# Patient Record
Sex: FEMALE | Race: WHITE
[De-identification: names, ages, dates, MRNs, and addresses within clinical notes are randomized per-mention and may not be internally consistent; named-entity substitution may affect disease eponyms.]

---

## 2020-10-19 ENCOUNTER — HOSPITAL ENCOUNTER (OUTPATIENT)
Dept: HOSPITAL 63 - ER | Age: 70
Setting detail: OBSERVATION
LOS: 1 days | Discharge: LEFT BEFORE DISCHARGE | End: 2020-10-20
Attending: HOSPITALIST | Admitting: HOSPITALIST
Payer: COMMERCIAL

## 2020-10-19 VITALS — BODY MASS INDEX: 44.41 KG/M2 | HEIGHT: 68 IN | WEIGHT: 293 LBS

## 2020-10-19 VITALS — SYSTOLIC BLOOD PRESSURE: 140 MMHG | DIASTOLIC BLOOD PRESSURE: 77 MMHG

## 2020-10-19 VITALS — DIASTOLIC BLOOD PRESSURE: 83 MMHG | SYSTOLIC BLOOD PRESSURE: 141 MMHG

## 2020-10-19 DIAGNOSIS — I50.9: ICD-10-CM

## 2020-10-19 DIAGNOSIS — I11.0: Primary | ICD-10-CM

## 2020-10-19 DIAGNOSIS — K21.9: ICD-10-CM

## 2020-10-19 DIAGNOSIS — E11.9: ICD-10-CM

## 2020-10-19 DIAGNOSIS — Z79.899: ICD-10-CM

## 2020-10-19 DIAGNOSIS — E66.01: ICD-10-CM

## 2020-10-19 LAB
ALBUMIN SERPL-MCNC: 3.3 G/DL (ref 3.4–5)
ALBUMIN/GLOB SERPL: 1 {RATIO} (ref 1–1.7)
ALP SERPL-CCNC: 93 U/L (ref 46–116)
ALT SERPL-CCNC: 25 U/L (ref 14–59)
ANION GAP SERPL CALC-SCNC: 8 MMOL/L (ref 6–14)
AST SERPL-CCNC: 16 U/L (ref 15–37)
BASOPHILS # BLD AUTO: 0.1 X10^3/UL (ref 0–0.2)
BASOPHILS NFR BLD: 1 % (ref 0–3)
BILIRUB SERPL-MCNC: 0.3 MG/DL (ref 0.2–1)
BUN/CREAT SERPL: 30 (ref 6–20)
CA-I SERPL ISE-MCNC: 24 MG/DL (ref 7–20)
CALCIUM SERPL-MCNC: 9.2 MG/DL (ref 8.5–10.1)
CHLORIDE SERPL-SCNC: 103 MMOL/L (ref 98–107)
CO2 SERPL-SCNC: 27 MMOL/L (ref 21–32)
CREAT SERPL-MCNC: 0.8 MG/DL (ref 0.6–1)
EOSINOPHIL NFR BLD: 0.3 X10^3/UL (ref 0–0.7)
EOSINOPHIL NFR BLD: 2 % (ref 0–3)
ERYTHROCYTE [DISTWIDTH] IN BLOOD BY AUTOMATED COUNT: 13.7 % (ref 11.5–14.5)
GFR SERPLBLD BASED ON 1.73 SQ M-ARVRAT: 71.3 ML/MIN
GLOBULIN SER-MCNC: 3.4 G/DL (ref 2.2–3.8)
GLUCOSE SERPL-MCNC: 58 MG/DL (ref 70–99)
HCT VFR BLD CALC: 39.7 % (ref 36–47)
HGB BLD-MCNC: 13.1 G/DL (ref 12–15.5)
LYMPHOCYTES # BLD: 3.2 X10^3/UL (ref 1–4.8)
LYMPHOCYTES NFR BLD AUTO: 27 % (ref 24–48)
MCH RBC QN AUTO: 29 PG (ref 25–35)
MCHC RBC AUTO-ENTMCNC: 33 G/DL (ref 31–37)
MCV RBC AUTO: 89 FL (ref 79–100)
MONO #: 1.1 X10^3/UL (ref 0–1.1)
MONOCYTES NFR BLD: 9 % (ref 0–9)
NEUT #: 7.3 X10^3UL (ref 1.8–7.7)
NEUTROPHILS NFR BLD AUTO: 61 % (ref 31–73)
PLATELET # BLD AUTO: 246 X10^3/UL (ref 140–400)
POTASSIUM SERPL-SCNC: 3.8 MMOL/L (ref 3.5–5.1)
PROT SERPL-MCNC: 6.7 G/DL (ref 6.4–8.2)
RBC # BLD AUTO: 4.46 X10^6/UL (ref 3.5–5.4)
SODIUM SERPL-SCNC: 138 MMOL/L (ref 136–145)
WBC # BLD AUTO: 12 X10^3/UL (ref 4–11)

## 2020-10-19 PROCEDURE — 80053 COMPREHEN METABOLIC PANEL: CPT

## 2020-10-19 PROCEDURE — G0379 DIRECT REFER HOSPITAL OBSERV: HCPCS

## 2020-10-19 PROCEDURE — 96372 THER/PROPH/DIAG INJ SC/IM: CPT

## 2020-10-19 PROCEDURE — 85025 COMPLETE CBC W/AUTO DIFF WBC: CPT

## 2020-10-19 PROCEDURE — 93005 ELECTROCARDIOGRAM TRACING: CPT

## 2020-10-19 PROCEDURE — 96374 THER/PROPH/DIAG INJ IV PUSH: CPT

## 2020-10-19 PROCEDURE — 71045 X-RAY EXAM CHEST 1 VIEW: CPT

## 2020-10-19 PROCEDURE — G0378 HOSPITAL OBSERVATION PER HR: HCPCS

## 2020-10-19 PROCEDURE — 36415 COLL VENOUS BLD VENIPUNCTURE: CPT

## 2020-10-19 PROCEDURE — 83880 ASSAY OF NATRIURETIC PEPTIDE: CPT

## 2020-10-19 PROCEDURE — 84484 ASSAY OF TROPONIN QUANT: CPT

## 2020-10-19 PROCEDURE — 99285 EMERGENCY DEPT VISIT HI MDM: CPT

## 2020-10-19 NOTE — NUR
Shortly after med pass pt checked her BS and notified this RN that she was going to need 
insulin this evening due to her sugar being at 234. This RN advised the pt that sugars under 
300 do not generally get treated with new orders from the dr. The pt then stated she had 
orders for insulin and it is this RN's fault that she didn't get the meds done quick enough 
to administer insulin before the Pt ate her dinner. This RN then notified the pt that her 
meds were entered and verified by the dr as quick as possible and that the RN was sorry. Pt 
then demanded insulin again stating it would screw up her A1C if she didnt get it. 2nd RN 
then went in to pt room to educate the pt on how A1C's are calculated. Refer to SK RN's 
nursing note. Nursing supervisor was notified of pts actions. This nurse then contacted dr 
for a one time dose of insulin. Dr gave an order for insulin. Pt then demanded a soda for 
her stomach being uneasy. Pt was given a diet sprite due to her being a diabetic. Pt refused 
the diet soda and demanded a regular sprite. Again nursing supervisor was informed of pts 
demands. This RN gave the pt a regular sprite and explained that it would be the only time 
she received a regular soda at the hospital due to her being a diabetic. We have to go by 
the diabetic diet which mean no extra sugars. This RN also explained to the pt that she just 
demanded insulin due to her sugars being to high according to pt. So what since does it make 
if I am going to give you more sugar when your sugars are high and you are demanding 
insulin.  pt the proceeded to drink the regular soda. pt received her one time dose of 
insulin at 0113.

## 2020-10-19 NOTE — RAD
INDICATION: Reason: SHORTNESS OF BREATH, cough / Spl. Instructions:  / 

History: 

 

COMPARISON: None.

 

FINDINGS:

 

Single view of chest obtained.

Cardiac silhouette is borderline enlarged but likely exaggerated by 

portable technique. The lung apices are external to the field-of-view. 

Calcific atherosclerosis. Degenerative changes throughout the spine. Mild 

interstitial prominence within the left lung greater than right

 

 

IMPRESSION:

 

*  Mild left greater than right interstitial prominence. This is a mild 

finding but mild pulmonary vascular congestion or early interstitial 

infiltrate can have this appearance.

 

Electronically signed by: Reinaldo Moon MD (10/19/2020 5:45 PM) 

[id number]

## 2020-10-19 NOTE — NUR
Pt was admitted to the floor this evening with a diagnosis of CHF. As soon as pt arrived on 
the floor she was demanding her scooter be brought to her or she was leaving the hospital. 
This RN explained upon report from ER nurse her scooter was going home with her daughter. Pt 
then started yelling she was leaving the hospital, and tearing off the telemetry monitor and 
ripping off hospital gown. Pt started packing her belongings. At this time  was notified 
of pt wanting to leave AMA.  stated it was fine for her to go. The Nursing Supervisor at 
the time was notified of pts demand for the scooter and that she was going to leave AMA 
without it. The Supervisor was able to contact the pts daughter who was still in the parking 
lot reloading the scooter to have her bring the scooter back into the hospital. Once the pt 
received her scooter she was willing to stay for treatment.

## 2020-10-19 NOTE — NUR
This nurse then proceeded the admission process with the pt. During this process the pt 
stated she was sorry for acting that way and then became tearful. This RN tried to comfort 
the pt and let her know that things are ok and that she was glad she stayed. During the 
admission process pt was calm and cooperative and able to answer any questions this RN had 
for her. After pts medication was continued by the dr. this RN notified the pt that her meds 
were being verified by the pharmacy and would be given soon. Pt was able to take all meds 
whole with no complications.

## 2020-10-19 NOTE — PHYS DOC
Past History


Past Medical History:  CHF, Diabetes, GERD, Heart Disease, Hypertension


 (KELI PRO DO)


Alcohol Use:  None


 (KELI PRO DO)





General Adult


EDM:


Chief Complaint:  COUGH





HPI:


HPI:


70-year-old female presents with cough and shortness of breath.  The patient has

had worsening shortness of breath for a couple of weeks.  She has been seen by 

her primary and placed on antibiotics.  She dismissed them yesterday with no 

effect.  The patient has a history of CHF.  She was hospitalized back in 

September for several days to take off 30+ pounds of fluid.  The patient feels 

like she did then.  Patient had a COVID-19 test recently and it was negative.  

She denies fever or chills.  She has worsening of her lower extremity edema 

bilaterally.


 (KELI PRO DO)





Review of Systems:


Review of Systems:


Constitutional:  Denies fever or chills 


Eyes:  Denies change in visual acuity 


HENT:  Denies nasal congestion or sore throat 


Respiratory: Cough with shortness of breath 


Cardiovascular: Worsening lower extremity edema


GI:  Denies abdominal pain, nausea, vomiting, bloody stools or diarrhea 


: Denies dysuria 


Musculoskeletal:  Denies back pain or joint pain 


Integument:  Denies rash 


Neurologic:  Denies headache, focal weakness or sensory changes 


Endocrine:  Denies polyuria or polydipsia 


Lymphatic:  Denies swollen glands 


Psychiatric:  Denies depression or anxiety


 (KELI PRO DO)





Allergies:


Allergies:





Allergies








Coded Allergies Type Severity Reaction Last Updated Verified


 


  Sulfa (Sulfonamide Antibiotics) Allergy Unknown  10/19/20 Yes








 (KELI PRO DO)





Physical Exam:


PE:





Constitutional: Well developed, well nourished, morbidly obese, no acute 

distress, non-toxic appearance. []


HENT: Normocephalic, atraumatic, bilateral external ears normal, oropharynx 

moist, no oral exudates, nose normal. []


Eyes: PERRLA, EOMI, conjunctiva normal, no discharge. [] 


Neck: Normal range of motion, no tenderness, supple, no stridor. [] 


Cardiovascular: Heart rate regular rhythm, no murmur []


Lungs & Thorax: Coughing.  Coarse bilateral breath sounds []


Abdomen: Bowel sounds normal, soft, no tenderness, no masses, no pulsatile 

masses. [] 


Skin: Warm, dry, no erythema, no rash. [] 


Back: No tenderness, no CVA tenderness. [] 


Extremities: No tenderness, no cyanosis, no clubbing, ROM intact, bilateral 

edema 4+ to the lower thigh. [] 


Neurologic: Alert and oriented X 3, normal motor function, normal sensory 

function, no focal deficits noted. []


Psychologic: Affect normal, judgement normal, mood normal. []


 (KELI PRO )





Current Patient Data:


Vital Signs:





                                   Vital Signs








  Date Time  Temp Pulse Resp B/P (MAP) Pulse Ox O2 Delivery O2 Flow Rate FiO2


 


10/19/20 16:45  88 20 111/79 (90) 97 Room Air  








 (KELI PRO )


Labs:





Laboratory Tests








Test


 10/19/20


17:32


 


White Blood Count


 12.0 x10^3/uL


(4.0-11.0)


 


Red Blood Count


 4.46 x10^6/uL


(3.50-5.40)


 


Hemoglobin


 13.1 g/dL


(12.0-15.5)


 


Hematocrit


 39.7 %


(36.0-47.0)


 


Mean Corpuscular Volume 89 fL () 


 


Mean Corpuscular Hemoglobin 29 pg (25-35) 


 


Mean Corpuscular Hemoglobin


Concent 33 g/dL


(31-37)


 


Red Cell Distribution Width


 13.7 %


(11.5-14.5)


 


Platelet Count


 246 x10^3/uL


(140-400)


 


Neutrophils (%) (Auto) 61 % (31-73) 


 


Lymphocytes (%) (Auto) 27 % (24-48) 


 


Monocytes (%) (Auto) 9 % (0-9) 


 


Eosinophils (%) (Auto) 2 % (0-3) 


 


Basophils (%) (Auto) 1 % (0-3) 


 


Neutrophils # (Auto)


 7.3 x10^3uL


(1.8-7.7)


 


Lymphocytes # (Auto)


 3.2 x10^3/uL


(1.0-4.8)


 


Monocytes # (Auto)


 1.1 x10^3/uL


(0.0-1.1)


 


Eosinophils # (Auto)


 0.3 x10^3/uL


(0.0-0.7)


 


Basophils # (Auto)


 0.1 x10^3/uL


(0.0-0.2)


 


Sodium Level


 138 mmol/L


(136-145)


 


Potassium Level


 3.8 mmol/L


(3.5-5.1)


 


Chloride Level


 103 mmol/L


()


 


Carbon Dioxide Level


 27 mmol/L


(21-32)


 


Anion Gap 8 (6-14) 


 


Blood Urea Nitrogen


 24 mg/dL


(7-20)


 


Creatinine


 0.8 mg/dL


(0.6-1.0)


 


Estimated GFR


(Cockcroft-Gault) 71.3 





 


BUN/Creatinine Ratio 30 (6-20) 


 


Glucose Level


 58 mg/dL


(70-99)


 


Calcium Level


 9.2 mg/dL


(8.5-10.1)


 


Total Bilirubin


 0.3 mg/dL


(0.2-1.0)


 


Aspartate Amino Transf


(AST/SGOT) 16 U/L (15-37) 





 


Alanine Aminotransferase


(ALT/SGPT) 25 U/L (14-59) 





 


Alkaline Phosphatase


 93 U/L


()


 


Troponin I Quantitative


 < 0.017 ng/mL


(0-0.055)


 


NT-Pro-B-Type Natriuretic


Peptide 266 pg/mL


(0-124)


 


Total Protein


 6.7 g/dL


(6.4-8.2)


 


Albumin


 3.3 g/dL


(3.4-5.0)


 


Albumin/Globulin Ratio 1.0 (1.0-1.7) 








 (BERNARDO CHOU DO)


EKG:


EKG:


Sinus rhythm, rate 77, normal axis, no ST elevation or depression.  []


 (KELI PRO DO)





Radiology/Procedures:


Radiology/Procedures:


[]


Impressions:


INDICATION: Reason: SHORTNESS OF BREATH, cough / Spl. Instructions:  / 


History: 


 


COMPARISON: None.


 


FINDINGS:


 


Single view of chest obtained.


Cardiac silhouette is borderline enlarged but likely exaggerated by 


portable technique. The lung apices are external to the field-of-view. 


Calcific atherosclerosis. Degenerative changes throughout the spine. Mild 


interstitial prominence within the left lung greater than right


 


 


IMPRESSION:


 


*  Mild left greater than right interstitial prominence. This is a mild 


finding but mild pulmonary vascular congestion or early interstitial 


infiltrate can have this appearance.


 


Electronically signed by: Delphine Moctezuma MD (10/19/2020 5:45 PM) 


[id number]














DICTATED AND SIGNED BY:     DELPHINE MOCTEZUMA MD


DATE:     10/19/20 5568





CC: KELI PRO DO; PCP,NO ~


 (KELI PRO DO)


Radiology/Procedures:





PROCEDURE: CHEST AP ONLY





 


INDICATION: Reason: SHORTNESS OF BREATH, cough / Spl. Instructions:  / 


History: 


 


COMPARISON: None.


 


FINDINGS:


 


Single view of chest obtained.


Cardiac silhouette is borderline enlarged but likely exaggerated by 


portable technique. The lung apices are external to the field-of-view. 


Calcific atherosclerosis. Degenerative changes throughout the spine. Mild 


interstitial prominence within the left lung greater than right


 


 


IMPRESSION:


 


*  Mild left greater than right interstitial prominence. This is a mild 


finding but mild pulmonary vascular congestion or early interstitial 


infiltrate can have this appearance.


 


Electronically signed by: Delphine Moctezuma MD (10/19/2020 5:45 PM) 


[id number]





 (BERNARDO CHOU DO)


Heart Score:


Risk Factors:


Risk Factors:  DM, Current or recent (<one month) smoker, HTN, HLP, family 

history of CAD, obesity.


Risk Scores:


Score 0 - 3:  2.5% MACE over next 6 weeks - Discharge Home


Score 4 - 6:  20.3% MACE over next 6 weeks - Admit for Clinical Observation


Score 7 - 10:  72.7% MACE over next 6 weeks - Early Invasive Strategies


 (KELI PRO DO)


HEART Score for Chest Pain:  








HEART Score for Chest Pain Response (Comments) Value


 


History Slighlty/Non-Suspicious 0


 


ECG Normal 0


 


Age > 65 2


 


Risk Factors >3 Risk Factors or Hx CAD 2


 


Troponin < Normal Limit 0


 


Total  4











Course & Med Decision Making:


Course & Med Decision Making


Pertinent Labs and Imaging studies reviewed. (See chart for details)


The patient's work-up is pending.  I am signing the patient out to Dr. Cole at 

1800.


[]


 (KELI PRO DO)


Course & Med Decision Making


I received comprehensive signout from off going physician


I went and saw patient and repeated the key aspects of history and physical 

exam, I reviewed entirety of work-up and discussed these at length with patient


Given clinical presentation of hypervolemic state that is causing patient to be 

symptomatic, I feel she would benefit from IV diuresis and hospital admission 

for acute exacerbation of CHF.  Patient was amenable to this


Dr. Catalan was called and case discussed, he was amenable to admission under his 

care for continued medical management


Patient was updated on this decision and agreed, all questions and concerns 

addressed prior to ER departure to [facility] for further care


 (BERNARDO CHOU DO)


Andry Disclaimer:


Dragon Disclaimer:


This electronic medical record was generated, in whole or in part, using a voice

 recognition dictation system.


 (KELI PRO DO)





Departure


Departure:


Impression:  


   Primary Impression:  


   Acute exacerbation of CHF (congestive heart failure)


   Additional Impression:  


   Morbid obesity


Disposition:  09 ADMITTED AS INPT THIS HOSP ([facility])


Admitting Physician:  Momo Catalan


 (BERNARDO CHOU DO)


Condition:  STABLE


Referrals:  


PCP,NO (PCP)











KELI PRO DO                 Oct 19, 2020 17:16


BERNARDO CHOU DO                 Oct 19, 2020 19:22

## 2020-10-19 NOTE — EKG
[facility] [address], [place] [address]

Test Date:    2020-10-19               Test Time:    16:58:50

Pat Name:     REJI LOZANO              Department:   

Patient ID:   [id number]           Room:          

Gender:       F                        Technician:   MARIE

:          1950               Requested By: KELI PRO

Order Number: [id number]            Reading MD:     

                                 Measurements

Intervals                              Axis          

Rate:         77                       P:            8

MS:           156                      QRS:          30

QRSD:         84                       T:            42

QT:           364                                    

QTc:          414                                    

                           Interpretive Statements

SINUS RHYTHM

NORMAL ECG

RI6.02

No previous ECG available for comparison

## 2020-10-20 NOTE — HP
ADMIT DATE:  10/20/2020



The patient is a 70-year-old female who was admitted to the ED on the evening of

10/19/2020 at 1922 hours.  I spoke with . ____ who asked me to admit her.  She

had a diagnosis of congestive heart failure exacerbation along with morbid

obesity.  She was given Lasix and admitted to my service.  However, shortly

after getting here, the patient became dissatisfied and decided to leave against

medical advice.  Therefore, she left the hospital AMA before I had a chance to

see her.





______________________________

TOM TYSON MD



DR:  LESLIE/matt  JOB#:  [id number] / [id number]

DD:  10/20/2020 11:49  DT:  10/20/2020 12:13

## 2020-10-20 NOTE — NUR
Staffed called to report the patient wanted to go AMA. I proceeded to the room to talk to 
the patient regarding her concerns. The patient was in the restroom; I explained I would 
come back when she was done.

I waited outside the room and heard her scooter moving in the room; I knocked and proceeded 
to enter. I introduced myself and asked what was going on. Patient stated, "I'm leaving this 
hospital. Give me my AMA paperwork." I asked if there was anything I could do to pursue her 
to stay. She stated, "This is just another [facility]. My   a year ago, and he 
would never go to [facility]." I explained that the hospitals were different but owned by 
the same company. She stated, "It's the same management." At this time, the patient was in 
her scooter with her back to me, getting dressing in her clothes.

I explained that I would like to go over a few things with her before she left. I discussed 
the risks of leaving the hospital, the possibility that insurance might not pay for the 
hospitalization, possible death, worsening medical condition, or disability. The patient 
continued to gather personal belongings. She pulled out her saline lock in her right upper 
arm and threw the saline lock on the floor. The saline catheter tip was intact. Piper KELLER 
entered the room with the AMA paperwork.  I tried to discuss the plan of care with the 
patient. She stated, "You haven't done anything for me. I have chest pain." Piper KELLER tried to 
explain what they were doing to monitor and treat her chest pain. The patient cut her off. 
The patient was yelling as she spoke and pushed the patient table aggressively toward staff. 
I asked the patient if she could please not yell at me. The patient refused. I maintained a 
safe distance from the patient for safety.  I held the AMA paperwork toward the patient and 
asked if she would sign it. The patient started to leave the room. I asked again if she 
would sign. In her hasty exit, she bumped her scooter into the wall. She said she was not 
refusing to sign the paperwork as she sped out of the room. 

-------------------------------------------------------------------------------

Addendum: 10/20/20 at 0242 by RODRICK DEAN RN

-------------------------------------------------------------------------------

I asked if there was anything I could do to persuade her to stay.

## 2020-10-20 NOTE — NUR
PT deemed to be knowledge deficit on meaning of A1c by main RN. PT believes that one high 
blood sugar will equate to a high A1c and "wreck it". This RN attempted to educate PT and 
tried to explain the math behind an A1c as a 3-month average. She said she knows how to do 
math and I am wrong. I stated she could ask her daughter to do the math if she did not 
believe me. PT yelled at me to leave the room, yelling out that "you're an asshole!".

## 2020-10-20 NOTE — NUR
I contacted the patient's daughter about her mother's AMA because she was listed as the 
designated spokesperson. A message was left on her voicemail. The hospital number was given 
in case she wanted talk to me or had further questions.

## 2020-10-20 NOTE — NUR
This RN and Nursing Supervisor went in to pts room to educate her on leaving AMA in hopes 
she would change her mind so she could receive  treatment. The pt was completely dressed in 
the clothes she came in, had ripped out her IV and was moving about on her scooter in her 
room gathering her things. Pt was bumping in to the table not caring if she ran things in to 
the nurses. Pt was educated on circumstances if she left and how it could affect her health. 
Pt refused to sign the AMA paperwork. Pt left her room with all her belongings and wearing 
weather appropriate clothes. Pt was escorted out by this RN and nurse aid. Pt left in her 
own vehicle. Daughter was called at 0135 by Nursing Supervisor.

## 2020-10-20 NOTE — NUR
Security notified of patients and AMA. A request was made to monitor the patient's exit of 
the hospital property. The  confirmed the request via radio.

## 2020-10-20 NOTE — NUR
After insulin administration pt wanted to know if she was given her HS dose of omeprazole. 
This RN told the pt she would check to verify if she was given the medication or not. This 
RN looked at the pts med rec. Omeprazole isnt available at this hospital so the pharmacy 
change the med to Protonix Extended Release. This RN then when back in to the pt room to 
explain why she was not given the medication this evening due to the hospital not caring the 
specific medication. This RN also explained the medication would begin in the morning due to 
it being an extended release tablet. Pt wouldnt except the RN explanation of why the med 
wasnt given. Pt then stated why am I her? This RN then told pt she was here because of CHF 
and that she was receiving treatment for it, and that down in the ED she was given Lasix to 
help Diurese her. Pt then wanted to know why she wasnt getting Lasix this evening. This RN 
then told the pt lasix isnt giving in the evening due to it keeping pt's up all night having 
to urinate. Pt then stated she wanted a brief and an external catheter so she could receive 
lasix tonight. This RN then stated we dont treat people with lasix in the evening and just 
put on a brief and a external catheter so they can sleep. Then we have to worry about skin 
break down from the moisture collecting in the brief. Pt then stated oh bullshit and once 
again would expect any explanation for treatment. Pt then stated she wanted to leave. 
Nursing supervisor was notified of pts request.